# Patient Record
Sex: MALE | Race: WHITE | NOT HISPANIC OR LATINO | Employment: STUDENT | URBAN - METROPOLITAN AREA
[De-identification: names, ages, dates, MRNs, and addresses within clinical notes are randomized per-mention and may not be internally consistent; named-entity substitution may affect disease eponyms.]

---

## 2023-05-02 ENCOUNTER — TELEMEDICINE (OUTPATIENT)
Dept: PRIMARY CARE | Facility: CLINIC | Age: 23
End: 2023-05-02
Payer: COMMERCIAL

## 2023-05-02 DIAGNOSIS — E03.9 HYPOTHYROIDISM, UNSPECIFIED TYPE: Primary | ICD-10-CM

## 2023-05-02 PROCEDURE — 99442 PR PHYS/QHP TELEPHONE EVALUATION 11-20 MIN: CPT | Performed by: INTERNAL MEDICINE

## 2023-05-02 NOTE — PROGRESS NOTES
Subjective   Patient ID: Ceasar Ramos is a 22 y.o. male who presents for No chief complaint on file..    HPI patient initiated telehealth visit this visit was completed via telephone secondary to the restrictions of the COVID-19 pandemic all medical issues were addressed and discussed with patient patient was not otherwise examined if it was felt that the patient needed to be seen in the office he would have been referred to do so patient verbally consented to visit  Sick visit graduated [employer] and is now working but is being transferred to [place] wanted to update on his thyroid function see prior has felt well overall no chest pain no shortness of breath no side effect with medication    Review of Systems    Objective   There were no vitals taken for this visit.    Physical Exam  Alert and oriented x3 breathing unlabored not otherwise examined  Assessment/Plan    impression General health hypothyroidism  Plan he will recheck for regular physical examination and blood work including TSH and T4 and perhaps T3 if needed before he leaves discussed with other aspects of general health including diet regular exercise increase water consumption self inspection and follow-up based on above

## 2023-05-05 ENCOUNTER — TELEMEDICINE (OUTPATIENT)
Dept: PRIMARY CARE | Facility: CLINIC | Age: 23
End: 2023-05-05
Payer: COMMERCIAL

## 2023-05-05 DIAGNOSIS — G44.209 TENSION HEADACHE: Primary | ICD-10-CM

## 2023-05-05 DIAGNOSIS — F41.9 ANXIETY: ICD-10-CM

## 2023-05-05 PROCEDURE — 99442 PR PHYS/QHP TELEPHONE EVALUATION 11-20 MIN: CPT | Performed by: INTERNAL MEDICINE

## 2023-05-05 NOTE — PROGRESS NOTES
Subjective   Patient ID: Ceasar Ramos is a 22 y.o. male who presents for No chief complaint on file..    HPI patient initiated telehealth visit this visit was completed via telephone secondary to the restrictions of the COVID-19 pandemic all medical issues were addressed and discussed with the patient patient was not otherwise examined if it was felt that the patient needed to be seen in the office they would have been referred to do so patient verbally consented to visit  Sick visit has been anxious regarding his health after visit elsewhere and medication use discussed with risk-benefit side effect of medication and will proceed discussed with anxiety regarding long-term implications and follow-up anxiety related with stress and headache no vision or dizziness complaints again he will be moving and may transition over a longer period of time as needed and is welcome to follow-up in that regard    Review of Systems    Objective   There were no vitals taken for this visit.    Physical Exam alert and oriented x3 breathing unlabored not otherwise examined    Assessment/Plan impression anxiety headache tension/stress other diagnoses  Plan okay for using medication as directed discussed with good diet regular exercise increase water consumption care and safety in the home the home discussed with anxiety relief pharmacological and nonpharmacological no pharmacological medication was suggested nor desired but may continue with nonpharmacological measures good nutrition and exercise sleep habits and other including tylenol or motrin as needed for cephalgia d/w sun exposure and recheck in office see prior

## 2023-07-14 LAB
AMPHETAMINE (PRESENCE) IN URINE BY SCREEN METHOD: ABNORMAL
BARBITURATES PRESENCE IN URINE BY SCREEN METHOD: ABNORMAL
BENZODIAZEPINE (PRESENCE) IN URINE BY SCREEN METHOD: ABNORMAL
CANNABINOIDS IN URINE BY SCREEN METHOD: ABNORMAL
COCAINE (PRESENCE) IN URINE BY SCREEN METHOD: ABNORMAL
DRUG SCREEN COMMENT URINE: ABNORMAL
FENTANYL URINE: ABNORMAL
METHADONE (PRESENCE) IN URINE BY SCREEN METHOD: ABNORMAL
OPIATES (PRESENCE) IN URINE BY SCREEN METHOD: ABNORMAL
OXYCODONE (PRESENCE) IN URINE BY SCREEN METHOD: ABNORMAL
PHENCYCLIDINE (PRESENCE) IN URINE BY SCREEN METHOD: ABNORMAL

## 2023-07-25 LAB — 11-NOR-9-CARBOXY-THC, URN, QUANT: NORMAL

## 2023-12-15 ENCOUNTER — OFFICE VISIT (OUTPATIENT)
Dept: ENDOCRINOLOGY | Facility: CLINIC | Age: 23
End: 2023-12-15
Payer: COMMERCIAL

## 2023-12-15 VITALS
TEMPERATURE: 99.2 F | WEIGHT: 166 LBS | HEIGHT: 67 IN | SYSTOLIC BLOOD PRESSURE: 114 MMHG | HEART RATE: 73 BPM | BODY MASS INDEX: 26.06 KG/M2 | DIASTOLIC BLOOD PRESSURE: 75 MMHG

## 2023-12-15 DIAGNOSIS — E04.1 THYROID NODULE: ICD-10-CM

## 2023-12-15 DIAGNOSIS — E03.9 ACQUIRED HYPOTHYROIDISM: Primary | ICD-10-CM

## 2023-12-15 PROCEDURE — 99214 OFFICE O/P EST MOD 30 MIN: CPT | Performed by: INTERNAL MEDICINE

## 2023-12-15 RX ORDER — LEVOTHYROXINE SODIUM 150 UG/1
150 TABLET ORAL DAILY
COMMUNITY
Start: 2018-03-08 | End: 2024-01-03 | Stop reason: SDUPTHER

## 2023-12-15 RX ORDER — METHYLPHENIDATE HYDROCHLORIDE 36 MG/1
36 TABLET, EXTENDED RELEASE ORAL EVERY MORNING
COMMUNITY

## 2023-12-15 NOTE — PATIENT INSTRUCTIONS
Great to meet you!  Please schedule a thyroid ultrasound for a possible nodule on the left side ([phone])  Please get labs drawn and I will refill your levothyroxine once reviewed.  Follow-up in a year, or sooner if needed    Adult endocrinology [phone]  Adult endocrinology office: [phone]

## 2023-12-15 NOTE — PROGRESS NOTES
"Subjective   Ceasar Ramos is a 23 y.o. male who presents to endocrinology clinic to transfer care from pediatric endocrinology for New Patient Visit (New pt visit for thyroid)  Last visit: 12/15/22    ENDOCRINE Hx: diagnosed with hypothyroidism at age 11    INTERVAL Hx: No issues in the past year.  Taking levothyroxine 150mcg daily, not missing doses, usually takes at least 30 in before eating BF.    Reports good energy and appetite.  Sleeping well.  Chronically runs cold.  Some hair loss.  Gained a bit of weight after college. No heat intolerance, diarrhea, constipation, skin changes, palpitations, tremors, myalgias, excessive weight changes, neck swelling, dysphagia.     Working out consistently.  Intermittent fasting 3x/week for 24h intervals, for the past 5 months.   in [place], a year out of college    ROS: otherwise negative.    Objective   /75 (BP Location: Right arm, Patient Position: Sitting, BP Cuff Size: Adult)   Pulse 73   Temp 37.3 °C (99.2 °F) (Tympanic)   Ht 1.702 m (5' 7\")   Wt 75.3 kg (166 lb)   BMI 26.00 kg/m²     Physical Exam  Alert and conversant, in no acute distress  Sclera anicteric, no lid lag, no proptosis  mmm  thyroid 20g, rubbery consistency with palpable L sided nodule and shotty LAD  normal work of breathing  normal muscle strength  normal DTRs  No resting tremor  Skin warm, normal moisture    Lab Results   Component Value Date    TSH 1.95 07/29/2022    FREET4 1.15 (H) 07/29/2022       Assessment/Plan     Acquired hypothyroidism  -     Thyroxine, Free; Future  -     Thyroid Stimulating Hormone; Future  -     continue LT4 150 mcg for now, will refill once labs results reviewed  Thyroid nodule  -     US thyroid; Future    FUV annually     "

## 2023-12-21 ENCOUNTER — ANCILLARY PROCEDURE (OUTPATIENT)
Dept: RADIOLOGY | Facility: CLINIC | Age: 23
End: 2023-12-21
Payer: COMMERCIAL

## 2023-12-21 ENCOUNTER — LAB (OUTPATIENT)
Dept: LAB | Facility: LAB | Age: 23
End: 2023-12-21
Payer: COMMERCIAL

## 2023-12-21 DIAGNOSIS — E04.1 THYROID NODULE: ICD-10-CM

## 2023-12-21 DIAGNOSIS — E03.9 ACQUIRED HYPOTHYROIDISM: ICD-10-CM

## 2023-12-21 LAB
T4 FREE SERPL-MCNC: 1.31 NG/DL (ref 0.78–1.48)
TSH SERPL-ACNC: 2.9 MIU/L (ref 0.44–3.98)

## 2023-12-21 PROCEDURE — 84443 ASSAY THYROID STIM HORMONE: CPT

## 2023-12-21 PROCEDURE — 76536 US EXAM OF HEAD AND NECK: CPT

## 2023-12-21 PROCEDURE — 36415 COLL VENOUS BLD VENIPUNCTURE: CPT

## 2023-12-21 PROCEDURE — 84439 ASSAY OF FREE THYROXINE: CPT

## 2023-12-21 PROCEDURE — 76536 US EXAM OF HEAD AND NECK: CPT | Performed by: RADIOLOGY

## 2024-01-03 DIAGNOSIS — E03.9 ACQUIRED HYPOTHYROIDISM: ICD-10-CM

## 2024-01-03 RX ORDER — LEVOTHYROXINE SODIUM 150 UG/1
TABLET ORAL
Qty: 90 TABLET | Refills: 3 | Status: SHIPPED | OUTPATIENT
Start: 2024-01-03

## 2024-01-03 NOTE — TELEPHONE ENCOUNTER
----- Message from Lorena Summers MD sent at 12/27/2023  2:46 PM EST -----  Thyroid labs normal on his current dose; needs refill on levothyroxine but no pharmacy on record.  Okay for 90 day supply x 3 refills to his pharmacy of preference.

## 2024-01-05 ENCOUNTER — TELEPHONE (OUTPATIENT)
Dept: ENDOCRINOLOGY | Facility: HOSPITAL | Age: 24
End: 2024-01-05
Payer: COMMERCIAL

## 2024-01-05 NOTE — TELEPHONE ENCOUNTER
----- Message from Lorena Summers MD sent at 1/4/2024 11:36 AM EST -----  Regarding: RE: Results  The thyroid ultrasound was normal, nothing we need to do other than continue the levothyroxine.  ----- Message -----  From: Megan Baker RN  Sent: 1/3/2024   3:28 PM EST  To: Lorena Summers MD  Subject: Results                                          Bulmaro Carrillo  I had called the patient several times and  left your message,finally received a call back with the pharmacy name. He had an ultra sound done and would like to talk to you about the results.  Thanks  Megan  ----- Message -----  From: Lorena Summers MD  Sent: 12/27/2023   2:46 PM EST  To: Megan Baker RN    Thyroid labs normal on his current dose; needs refill on levothyroxine but no pharmacy on record.  Okay for 90 day supply x 3 refills to his pharmacy of preference.

## 2024-12-17 NOTE — PROGRESS NOTES
"Subjective   Ceasar Ramos is a 24 y.o. male who presents to endocrinology clinic for Follow-up  Last visit: 12/15/23    ENDOCRINE Hx: diagnosed with hypothyroidism at age 11.  Normal thyroid US Dec 2023    INTERVAL Hx:   Taking levothyroxine 150mcg, every morning, rare missed doses    Reports good energy and appetite.  Feeling a little stressed and having occasional racing heart.  No diarrhea, constipation, skin/hair changes, tremors, myalgias, excessive weight changes, neck swelling, dysphagia.    Living with parents, working as     ROS: otherwise negative.    Objective   /70 (BP Location: Left arm, Patient Position: Sitting, BP Cuff Size: Adult)   Pulse 70   Temp 36.3 °C (97.3 °F) (Temporal)   Ht 1.702 m (5' 7\")   Wt 72.6 kg (160 lb)   BMI 25.06 kg/m²     Physical Exam  Alert and conversant, in no acute distress  Sclera anicteric, no lid lag, no proptosis  mmm  thyroid 20g, rubbery consistency without discrete nodule felt and no LAD  normal work of breathing  No resting tremor  Skin warm, normal moisture  Normal mood/affect    Lab Results   Component Value Date    TSH 3.83 12/19/2024    TSH 2.90 12/21/2023    TSH 1.95 07/29/2022    TSH 2.66 07/02/2021    TSH 3.23 12/16/2020    FREET4 1.60 (H) 12/19/2024    FREET4 1.31 12/21/2023    FREET4 1.15 (H) 07/29/2022    FREET4 1.06 07/02/2021    FREET4 1.12 12/16/2020         Assessment/Plan   24 y.o. M with Acquired hypothyroidism  Labs ordered for appt reviewed; clinically and biochemically euthyroid on current dose    PLAN  Continue levothyroxine (Synthroid, Levoxyl) 150 mcg tablet; Take one tablet in the am on am empty stomach as directed  FUV annually or sooner PRN     "

## 2024-12-19 ENCOUNTER — LAB (OUTPATIENT)
Dept: LAB | Facility: LAB | Age: 24
End: 2024-12-19
Payer: COMMERCIAL

## 2024-12-19 DIAGNOSIS — E03.9 ACQUIRED HYPOTHYROIDISM: ICD-10-CM

## 2024-12-19 LAB
T4 FREE SERPL-MCNC: 1.6 NG/DL (ref 0.78–1.48)
TSH SERPL-ACNC: 3.83 MIU/L (ref 0.44–3.98)

## 2024-12-19 PROCEDURE — 84439 ASSAY OF FREE THYROXINE: CPT

## 2024-12-19 PROCEDURE — 36415 COLL VENOUS BLD VENIPUNCTURE: CPT

## 2024-12-19 PROCEDURE — 84443 ASSAY THYROID STIM HORMONE: CPT

## 2024-12-20 ENCOUNTER — APPOINTMENT (OUTPATIENT)
Dept: ENDOCRINOLOGY | Facility: CLINIC | Age: 24
End: 2024-12-20
Payer: COMMERCIAL

## 2024-12-20 ENCOUNTER — OFFICE VISIT (OUTPATIENT)
Dept: ENDOCRINOLOGY | Facility: CLINIC | Age: 24
End: 2024-12-20
Payer: COMMERCIAL

## 2024-12-20 VITALS
TEMPERATURE: 97.3 F | WEIGHT: 160 LBS | DIASTOLIC BLOOD PRESSURE: 70 MMHG | HEART RATE: 70 BPM | BODY MASS INDEX: 25.11 KG/M2 | HEIGHT: 67 IN | SYSTOLIC BLOOD PRESSURE: 120 MMHG

## 2024-12-20 DIAGNOSIS — E03.9 ACQUIRED HYPOTHYROIDISM: Primary | ICD-10-CM

## 2024-12-20 PROCEDURE — 99213 OFFICE O/P EST LOW 20 MIN: CPT | Performed by: INTERNAL MEDICINE

## 2024-12-20 PROCEDURE — 3008F BODY MASS INDEX DOCD: CPT | Performed by: INTERNAL MEDICINE

## 2024-12-20 RX ORDER — LEVOTHYROXINE SODIUM 150 UG/1
TABLET ORAL
Qty: 90 TABLET | Refills: 3 | Status: SHIPPED | OUTPATIENT
Start: 2024-12-20

## 2024-12-20 ASSESSMENT — PATIENT HEALTH QUESTIONNAIRE - PHQ9
1. LITTLE INTEREST OR PLEASURE IN DOING THINGS: NOT AT ALL
2. FEELING DOWN, DEPRESSED OR HOPELESS: NOT AT ALL
SUM OF ALL RESPONSES TO PHQ9 QUESTIONS 1 AND 2: 0

## 2025-12-22 ENCOUNTER — APPOINTMENT (OUTPATIENT)
Dept: ENDOCRINOLOGY | Facility: CLINIC | Age: 25
End: 2025-12-22
Payer: COMMERCIAL